# Patient Record
Sex: FEMALE | Race: WHITE | NOT HISPANIC OR LATINO | Employment: OTHER | URBAN - METROPOLITAN AREA
[De-identification: names, ages, dates, MRNs, and addresses within clinical notes are randomized per-mention and may not be internally consistent; named-entity substitution may affect disease eponyms.]

---

## 2018-06-29 ENCOUNTER — HOSPITAL ENCOUNTER (OUTPATIENT)
Dept: MRI IMAGING | Facility: HOSPITAL | Age: 68
Discharge: HOME | End: 2018-06-29
Attending: NEUROLOGICAL SURGERY | Admitting: NEUROLOGICAL SURGERY

## 2018-06-29 ENCOUNTER — TRANSCRIBE ORDERS (OUTPATIENT)
Dept: ADMINISTRATIVE | Facility: HOSPITAL | Age: 68
End: 2018-06-29

## 2018-06-29 DIAGNOSIS — R20.0 SENSORY LOSS: ICD-10-CM

## 2018-06-29 DIAGNOSIS — M50.20 HERNIATED DISC, CERVICAL: Primary | ICD-10-CM

## 2018-06-29 DIAGNOSIS — M50.20 HERNIATED DISC, CERVICAL: ICD-10-CM

## 2018-06-29 DIAGNOSIS — R53.1 ACUTE WEAKNESS: ICD-10-CM

## 2018-06-29 PROCEDURE — 72141 MRI NECK SPINE W/O DYE: CPT

## 2018-06-29 NOTE — NURSING NOTE
Dr. Taylor called, said patient could go home, will have office call and schedule additional imaging for next week. Patients sister and patient informed. Home with family.